# Patient Record
Sex: MALE | Race: WHITE | NOT HISPANIC OR LATINO | Employment: FULL TIME | URBAN - METROPOLITAN AREA
[De-identification: names, ages, dates, MRNs, and addresses within clinical notes are randomized per-mention and may not be internally consistent; named-entity substitution may affect disease eponyms.]

---

## 2017-10-12 ENCOUNTER — OFFICE VISIT - RIVER FALLS (OUTPATIENT)
Dept: FAMILY MEDICINE | Facility: CLINIC | Age: 57
End: 2017-10-12

## 2017-10-26 ENCOUNTER — OFFICE VISIT - RIVER FALLS (OUTPATIENT)
Dept: FAMILY MEDICINE | Facility: CLINIC | Age: 57
End: 2017-10-26

## 2017-10-26 ASSESSMENT — MIFFLIN-ST. JEOR: SCORE: 2780.53

## 2017-10-28 LAB — CREAT SERPL-MCNC: 0.86 MG/DL (ref 0.7–1.33)

## 2017-12-07 ENCOUNTER — OFFICE VISIT - RIVER FALLS (OUTPATIENT)
Dept: FAMILY MEDICINE | Facility: CLINIC | Age: 57
End: 2017-12-07

## 2017-12-07 ASSESSMENT — MIFFLIN-ST. JEOR: SCORE: 2821.35

## 2018-11-10 ENCOUNTER — OFFICE VISIT - RIVER FALLS (OUTPATIENT)
Dept: FAMILY MEDICINE | Facility: CLINIC | Age: 58
End: 2018-11-10

## 2018-11-11 LAB
CHOLEST SERPL-MCNC: 207 MG/DL
CHOLEST/HDLC SERPL: 3.9 {RATIO}
CREAT SERPL-MCNC: 0.91 MG/DL (ref 0.7–1.33)
GLUCOSE BLD-MCNC: 104 MG/DL (ref 65–99)
HDLC SERPL-MCNC: 53 MG/DL
LDLC SERPL CALC-MCNC: 137 MG/DL
NONHDLC SERPL-MCNC: 154 MG/DL
TRIGL SERPL-MCNC: 80 MG/DL

## 2019-08-22 ENCOUNTER — OFFICE VISIT - RIVER FALLS (OUTPATIENT)
Dept: FAMILY MEDICINE | Facility: CLINIC | Age: 59
End: 2019-08-22

## 2019-08-22 ASSESSMENT — MIFFLIN-ST. JEOR: SCORE: 2733.35

## 2020-03-02 ENCOUNTER — OFFICE VISIT - RIVER FALLS (OUTPATIENT)
Dept: FAMILY MEDICINE | Facility: CLINIC | Age: 60
End: 2020-03-02

## 2020-03-02 ASSESSMENT — MIFFLIN-ST. JEOR: SCORE: 2753.31

## 2021-11-29 ENCOUNTER — AMBULATORY - RIVER FALLS (OUTPATIENT)
Dept: FAMILY MEDICINE | Facility: CLINIC | Age: 61
End: 2021-11-29

## 2022-02-12 VITALS
TEMPERATURE: 98.6 F | DIASTOLIC BLOOD PRESSURE: 84 MMHG | HEART RATE: 76 BPM | BODY MASS INDEX: 40.43 KG/M2 | WEIGHT: 315 LBS | SYSTOLIC BLOOD PRESSURE: 142 MMHG | HEIGHT: 74 IN

## 2022-02-12 VITALS
TEMPERATURE: 98.4 F | HEART RATE: 68 BPM | DIASTOLIC BLOOD PRESSURE: 92 MMHG | OXYGEN SATURATION: 94 % | BODY MASS INDEX: 40.43 KG/M2 | SYSTOLIC BLOOD PRESSURE: 162 MMHG | HEART RATE: 74 BPM | SYSTOLIC BLOOD PRESSURE: 148 MMHG | TEMPERATURE: 97.9 F | HEIGHT: 74 IN | WEIGHT: 315 LBS | DIASTOLIC BLOOD PRESSURE: 82 MMHG | WEIGHT: 315 LBS

## 2022-02-12 VITALS
WEIGHT: 315 LBS | BODY MASS INDEX: 40.43 KG/M2 | DIASTOLIC BLOOD PRESSURE: 82 MMHG | HEIGHT: 74 IN | HEART RATE: 88 BPM | SYSTOLIC BLOOD PRESSURE: 140 MMHG | TEMPERATURE: 98.6 F

## 2022-02-12 VITALS
HEART RATE: 80 BPM | DIASTOLIC BLOOD PRESSURE: 84 MMHG | WEIGHT: 315 LBS | SYSTOLIC BLOOD PRESSURE: 148 MMHG | HEIGHT: 74 IN | BODY MASS INDEX: 40.43 KG/M2 | TEMPERATURE: 98.1 F

## 2022-02-15 NOTE — PROGRESS NOTES
Patient:   MARTINEZ BLUE            MRN: [id number]            FIN: [id number]               Age:   57 years     Sex:  Male     :  1960   Associated Diagnoses:   Well adult; Hypertension; Obesity; ELZA (Obstructive Sleep Apnea); Personal history of colonic polyps   Author:   Sammy Montanez MD      Visit Information      Date of Service: 2017 04:37 pm  Performing Location: [facility]  Encounter#: [id number]      Primary Care Provider (PCP):  Sammy Montanez MD    NPI# [id number]   Visit type:  Annual exam.    Accompanied by:  No one.    Source of history:  Self, Medical record.    History limitation:  None.       Chief Complaint   2017 4:43 PM CST    annual px, recently seen for HTN f/u in October        Well Adult History   Well Adult History             The patient presents for well adult exam.  The patient's general health status is described as fair.  The patient's diet is described as balanced.  Exercise: none.  Associated symptoms consist of none.  Additional pertinent history: occasional caffeine use, tobacco use none, daily alcohol use and alcohol use: 3-4 per day.       colonoscopy:  due  lipid and diabetes screening:  up to date   prostate cancer screening:  up to date   heart disease risk:  moderate  immunizations:  up to date   other:  Hypertension improving         Review of Systems   Constitutional:  No fever, No chills, No sweats, No weakness, No fatigue.    Eye:  No recent visual problem.    Ear/Nose/Mouth/Throat:  No decreased hearing, No nasal congestion, No sore throat.    Respiratory:  No shortness of breath, No cough.    Cardiovascular:  Negative, No chest pain, No palpitations, No peripheral edema.    Gastrointestinal:  No nausea, No vomiting, No diarrhea, No constipation, No heartburn.    Genitourinary:  No dysuria, No change in urine stream.    Hematology/Lymphatics:  No bruising tendency, No bleeding tendency.    Endocrine:  No cold intolerance, No  heat intolerance.    Immunologic:  Negative.    Musculoskeletal:  No back pain, No neck pain, No joint pain, No muscle pain.    Integumentary:  No rash, No dryness, No skin lesion.    Neurologic:  Alert and oriented X4, No headache.    Psychiatric:  No anxiety, No depression.      PHQ9 and CAGE reviewed and discussed with patient.       Health Status   Allergies:    Allergic Reactions (Selected)  No Known Medication Allergies   Medications:  (Selected)   Prescriptions  Prescribed  Replacement CPAP +11cm H2o: Replacement CPAP +11cm H2o, See Instructions, Instructions: Heated humidifier, heated tubing, filters, nasal or full face mask of choice with headgear.  Replacement cushions and supplies as needed.  Months = 99 (Lifetime), Supply, # 1 EA, 0 Refill(s),...  lisinopril 20 mg oral tablet: 1 tab(s) ( 20 mg ), PO, Daily, # 30 tab(s), 5 Refill(s), Type: Maintenance, Pharmacy: [facility], 1 tab(s) po daily  omeprazole 20 mg oral delayed release capsule: 1 cap(s) ( 20 mg ), PO, Daily, # 90 cap(s), 0 Refill(s), Type: Maintenance, Pharmacy: [facility] [place], 1 cap(s) po daily  Documented Medications  Documented  Aspir 81: ( 81 mg ), po, daily, tab(s), 0 Refill(s), Type: Maintenance   Problem list:    All Problems  Alcohol use / SNOMED CT 4290229 / Confirmed  Cancer of colon / SNOMED CT 256356300 / Confirmed  Hypertension / SNOMED CT 7723500095 / Confirmed  Obesity / ICD-9-.00 / Probable  ELZA (Obstructive Sleep Apnea) / ICD-9-.23 / Confirmed  Personal history of colonic polyps / SNOMED CT 0508297171 / Confirmed  Resolved: Rectal cancer / ICD-9-.1      Histories   Past Medical History:    Active  ELZA (Obstructive Sleep Apnea) (327.23): Onset in 2010 at 49 years.  Personal history of colonic polyps (2841001801): Onset on 11/9/2009 at 49 years.  Cancer of colon (223532949): Onset on 7/27/2009 at 49 years.  Obesity (278.00)  Resolved  Rectal cancer (154.1):   Resolved.   Family History:    Polyp of colon  Brother  Diabetes mellitus type II  Father  CA - Cancer of colon  Mother (unknown): onset at 72 .     Procedure history:    Colonoscopy (SNOMED CT 800674293) on 5/5/2014 at 54 Years.  Comments:  5/12/2014 5:02 PM - Lucina Davis RN  Sedation: MAC  Indication: personal history of colon cancer  Tubular adenoma x2  Repeat in 3 years  Port-A-Cath removal on 6/25/2012 at 52 Years.  Removal of two skin tags from the periocular area - Left on 6/25/2012 at 52 Years.  Colonoscopy (SNOMED CT 610352789) on 12/19/2011 at 51 Years.  Comments:  1/19/2012 2:52 PM - Quintin Stevens MA  Repeat in 2 years due to history of rectal cancer with MAC sedation  Colonoscopy (SNOMED CT 432731945) on 11/18/2010 at 50 Years.  Drainage of rectal abscess on 7/16/2010 at 50 Years.  Polysomnogram (SNOMED CT 679210229) on 5/23/2010 at 50 Years.  Comments:  11/17/2015 3:27 PM - Violet Goodman CMA  AHI 72.5/hr.  CPAP titrated to +11cm H2o which reduced AHI to 4/hr  Rectal carcinoma (SNOMED CT 865098594) on 11/9/2009 at 49 Years.  Comments:  7/12/2010 6:00 PM - Leandro Britton MD  abd perineal  resection  Colonoscopy (SNOMED CT 061087580) on 11/2/2009 at 49 Years.  Colostomy (SNOMED CT 4366596050) in the month of 11/2009 at 49 Years.  Colonoscopy (SNOMED CT 617477345) on 7/27/2009 at 49 Years.  Rotator cuff repair (SNOMED CT 870988694).  Comments:  1/21/2015 11:21 AM - Ninoska Mejia  left  Extraction of wisdom tooth (SNOMED CT 294027576).   Social History:             No active social history items have been recorded.      Physical Examination   Vital Signs   12/7/2017 4:43 PM CST Temperature Tympanic 98.1 DegF    Peripheral Pulse Rate 80 bpm    Pulse Site Radial artery    HR Method Manual    Systolic Blood Pressure 148 mmHg  HI    Diastolic Blood Pressure 84 mmHg    Mean Arterial Pressure 105 mmHg    BP Site Right arm    BP Method Manual      Measurements from flowsheet : Measurements   12/7/2017 4:43  PM CST Height Measured - Standard 73.5 in    Weight Measured - Standard 432 lb    BSA 3.18 m2    Body Mass Index 56.22 kg/m2      General:  Alert and oriented, No acute distress.    Eye:  Pupils are equal, round and reactive to light, Extraocular movements are intact, Normal conjunctiva.    HENT:  Normocephalic, Tympanic membranes are clear, Oral mucosa is moist, No pharyngeal erythema, No sinus tenderness.    Neck:  Supple, Non-tender, No carotid bruit, No lymphadenopathy, No thyromegaly.    Respiratory:  Lungs are clear to auscultation, Respirations are non-labored, Breath sounds are equal, No chest wall tenderness.    Cardiovascular:  Normal rate, Regular rhythm, No murmur, No gallop, Good pulses equal in all extremities, Normal peripheral perfusion, No edema.    Gastrointestinal:  Soft, Non-tender, Non-distended, Normal bowel sounds, No organomegaly.         Abdomen: Obese, Stoma ( Colostomy ).    Genitourinary:  No costovertebral angle tenderness.    Lymphatics:  WNL.    Musculoskeletal:  Normal range of motion, Normal strength, No tenderness, No swelling, No deformity.    Integumentary:  Warm, Dry, No rash.    Neurologic:  Alert, Oriented, Normal sensory, Normal motor function, No focal deficits.    Psychiatric:  Cooperative, Appropriate mood & affect.       Review / Management   Results review:  Lab results   10/26/2017 7:08 PM CDT Potassium Level 4.5 mmol/L    Creatinine 0.86 mg/dL    eGFR 96 mL/min/1.73m2    eGFR African American 112 mL/min/1.73m2   10/12/2017 7:15 PM CDT Sodium Level 142 mmol/L    Potassium Level 4.2 mmol/L    Chloride Level 103 mmol/L    CO2 Level 26 mmol/L    AGAP 13    Glucose Level 101 mg/dL    BUN 10 mg/dL    Creatinine 0.80 mg/dL    BUN/Creat Ratio 13    eGFR  >60    eGFR Non-African American >60    Calcium Level 9.6 mg/dL    Troponin I 0.010 ng/mL    WBC 6.7    RBC 5.09    Hgb 14.9 g/dL    Hct 44.4 %    MCV 87 fL    MCH 29.3 pg    MCHC 33.6 g/dL    RDW 14.1 %     Platelet 218    MPV 9.9 fL    Lymphs 21.5 %    Abs Lymphs 1.4    Neutrophils 67.8 %    Abs Neutrophils 4.6    Monocytes 8.3 %    Abs Monocytes 0.6    Eosinophils 2.1 %    Abs Eosinophils 0.1    Basophils 0.3 %    Abs Basophils 0.0    D-Dimer 0.31   .       Impression and Plan   Diagnosis     Well adult (NVG27-LR Z00.00).     Hypertension (AML63-BW I10).     Obesity (YWU85-NS E66.9).     ELZA (Obstructive Sleep Apnea) (PJX04-FD G47.33).     Personal history of colonic polyps (JEH70-XL Z86.010).     Course:  Progressing as expected.    Plan:  Discussed health maintenance, diet, exercise, BMI discussed, increase lisinopril to 40 mg a day, set up for colonoscopy.

## 2022-02-15 NOTE — TELEPHONE ENCOUNTER
Order, notes and insurance card are sent to [facility] and they will contact patient.Manisha Nicholson called stating she tired to schedule for patient but [facility] did not have an order.  I checked with Central Scheduling and order was there but not yet scanned into chart.  [facility] will call for scheduling today.  Manisha notified to expect call.

## 2022-02-15 NOTE — CARE COORDINATION
Pt appears on [facility] chronic disease panel as out of parameters for HTN.  Pt is in recall pool for appt, pt had labs done and provider stated in letter to make appt to come in for visit.  Will wait on pt response.

## 2022-02-15 NOTE — PROGRESS NOTES
Patient:   MARTINEZ BLUE            MRN: [id number]            FIN: [id number]               Age:   57 years     Sex:  Male     :  1960   Associated Diagnoses:   Chest tightness; Shortness of breath; Hypertension   Author:   Kay Tao      Visit Information      Date of Service: 10/12/2017 06:06 pm  Performing Location: [facility]  Encounter#: [id number]      Primary Care Provider (PCP):  Sammy Montanez MD    NPI# [id number]      Referring Provider:  Kay Tao    NPI# [id number]      Chief Complaint   10/12/2017 6:21 PM CDT   Pt c/o shortness of breath and chest tightness x2 days        History of Present Illness   reviewed presenting problem as above with patient  here with his wife  States he has had episodes of the above in the past but yesterday rates CP 8/10. Duration of an hour or so, worse with climbing stairs  Denies cough or congestion  Denies heart or lung problems in the past.  Has had HTN but has chosen not to treat it  Non smoker  No strong FH heart dz in family  Does drink 4-6 beers per day  Cholesterol has been normal about 2 years ago  Did not take any OTC meds  Has not struggled with stomach acid in past      Review of Systems   Constitutional:  No fever, No chills.    Ear/Nose/Mouth/Throat:  No ear pain, No nasal congestion, No sore throat.    Respiratory:  Shortness of breath, No cough, No wheezing.    Gastrointestinal:  No nausea, No vomiting, No diarrhea.              Health Status   Allergies:    Allergic Reactions (Selected)  No known allergies   Medications:  (Selected)   Prescriptions  Prescribed  Replacement CPAP +11cm H2o: Replacement CPAP +11cm H2o, See Instructions, Instructions: Heated humidifier, heated tubing, filters, nasal or full face mask of choice with headgear.  Replacement cushions and supplies as needed.  Months = 99 (Lifetime), Supply, # 1 EA, 0 Refill(s),...  lisinopril 10 mg oral tablet: 1 tab(s) ( 10 mg ), PO, Daily, # 30  tab(s), 0 Refill(s), Type: Maintenance, Pharmacy: [facility], 1 tab(s) po daily  omeprazole 20 mg oral delayed release capsule: 1 cap(s) ( 20 mg ), PO, Daily, # 90 cap(s), 0 Refill(s), Type: Maintenance, Pharmacy: [facility], 1 cap(s) po daily  Documented Medications  Documented  Aspir 81: ( 81 mg ), po, daily, tab(s), 0 Refill(s), Type: Maintenance   Problem list:    All Problems  Hypertension / SNOMED CT 0138140566 / Confirmed  Alcohol use / SNOMED CT 0911714 / Confirmed  Obesity / ICD-9-.00 / Probable  ELZA (Obstructive Sleep Apnea) / ICD-9-.23 / Confirmed  Cancer of colon / SNOMED CT 172005666 / Confirmed  Resolved: Rectal cancer / ICD-9-.1      Histories   Past Medical History:    Active  ELZA (Obstructive Sleep Apnea) (327.23): Onset in 2010 at 49 years.  Obesity (278.00)  Resolved  Rectal cancer (154.1):  Resolved.   Family History:    Polyp of colon  Brother  Diabetes mellitus type II  Father  CA - Cancer of colon  Mother (unknown): onset at 72 .     Procedure history:    Colonoscopy (SNOMED CT 175088446) performed by Tristan Lester MD on 5/5/2014 at 54 Years.  Comments:  5/12/2014 5:02 PM - Lucina Davis RN  Sedation: MAC  Indication: personal history of colon cancer  Tubular adenoma x2  Repeat in 3 years  Port-A-Cath removal performed by Tristan Lester MD on 6/25/2012 at 52 Years.  Removal of two skin tags from the periocular area - Left performed by Tristan Lester MD on 6/25/2012 at 52 Years.  Colonoscopy (SNOMED CT 929510523) on 12/19/2011 at 51 Years.  Comments:  1/19/2012 2:52 PM - Quintin Stevens MA  Repeat in 2 years due to history of rectal cancer with MAC sedation  Colonoscopy (SNOMED CT 522680774) performed by Tristan Lester MD on 11/18/2010 at 50 Years.  Drainage of rectal abscess performed by Tristan Lester MD on 7/16/2010 at 50 Years.  Polysomnogram (SNOMED CT 932813890) on 5/23/2010 at 50 Years.  Comments:  11/17/2015  3:27 PM - Violet Goodman CMA  AHI 72.5/hr.  CPAP titrated to +11cm H2o which reduced AHI to 4/hr  Rectal carcinoma (SNOMED CT 644832333) on 11/9/2009 at 49 Years.  Comments:  7/12/2010 6:00 PM - Leandro Britton MD  abd perineal  resection  Colonoscopy (SNOMED CT 673943824) on 11/2/2009 at 49 Years.  Colostomy (SNOMED CT 5552001150) in the month of 11/2009 at 49 Years.  Colonoscopy (SNOMED CT 108289903) on 7/27/2009 at 49 Years.  Rotator cuff repair (SNOMED CT 899585698).  Comments:  1/21/2015 11:21 AM - Ninoska Mejia  left  Extraction of wisdom tooth (SNOMED CT 482621140).   Social History:             No active social history items have been recorded.      Physical Examination   Vital Signs   10/12/2017 6:21 PM CDT Temperature Tympanic 98.4 DegF    Peripheral Pulse Rate 74 bpm    Pulse Site Brachial artery    HR Method Electronic    Systolic Blood Pressure 162 mmHg  HI    Diastolic Blood Pressure 92 mmHg  HI    Mean Arterial Pressure 115 mmHg    BP Site Right arm    BP Method Electronic    Oxygen Saturation 94 %      Measurements from flowsheet : Measurements   10/12/2017 6:21 PM CDT   Weight Measured - Standard                428.4 lb     General:  Alert and oriented, No acute distress.    Eye:  Normal conjunctiva.    HENT:  Tympanic membranes are clear, Normal hearing, Oral mucosa is moist, No pharyngeal erythema, No sinus tenderness.    Neck:  Supple, Non-tender, No lymphadenopathy.    Respiratory:  Lungs are clear to auscultation, Respirations are non-labored, Breath sounds are equal, Symmetrical chest wall expansion.    Cardiovascular:  Normal rate, Regular rhythm, No murmur.    Gastrointestinal:  Soft, Non-tender.    Musculoskeletal:  Normal range of motion, Normal gait.    Integumentary:  Warm, Dry, Pink, No rash.    Neurologic:  Alert, Oriented.    Psychiatric:  Cooperative.       Review / Management   Results review:  Lab results   10/12/2017 7:15 PM CDT Sodium Level 142 mmol/L    Potassium Level 4.2  mmol/L    Chloride Level 103 mmol/L    CO2 Level 26 mmol/L    AGAP 13    Glucose Level 101 mg/dL    BUN 10 mg/dL    Creatinine Level 0.80 mg/dL    BUN/Creat Ratio 13    eGFR  >60    eGFR Non-African American >60    Calcium Level 9.6 mg/dL    Troponin I 0.010 ng/mL    WBC 6.7    RBC 5.09    Hgb 14.9 g/dL    Hct 44.4 %    MCV 87 fL    MCH 29.3 pg    MCHC 33.6 g/dL    RDW 14.1 %    Platelet 218    MPV 9.9 fL    Lymphocytes 21.5 %    Abs Lymphocytes 1.4    Neutrophils 67.8 %    Abs Neutrophils 4.6    Monocytes 8.3 %    Abs Monocytes 0.6    Eosinophils 2.1 %    Abs Eosinophils 0.1    Basophils 0.3 %    Abs Basophils 0.0    D-Dimer 0.31   .         Interpretation: Labs unremarkable.    Radiology results   X-ray, X-ray reviewed with patient, no abnormality noted.  Will await radiology over-read and if differs such that treatment would be altered,  will notify patient.    ECG interpretation:  Time  10/12/2017 8:27:00 PM, read per Dr MARY Sousa.       Impression and Plan   Diagnosis     Chest tightness (PWH16-GT R07.89).     Shortness of breath (GXB30-CC R06.02).     Hypertension (TVU12-KX I10).     Course:  Improving, was given GI cocktail, pain from 4 to 3 .    Patient Instructions:       Counseled: Patient, Regarding diagnosis, Regarding treatment, Regarding medications, Verbalized understanding, I asked him to see his primary early next week for further eval  Will start lisinopril and regular ASA use and PPI use today  Reduce ETOH  If any recurrence of similar pain seek ER Eval.    Orders     Orders (Selected)   Outpatient Orders  Order  Return to Clinic (Request): RFV: within 2 weeks for recheck with primary of chest pain and HTN, Return in 2 weeks  Prescriptions  Prescribed  lisinopril 10 mg oral tablet: 1 tab(s) ( 10 mg ), PO, Daily, # 30 tab(s), 0 Refill(s), Type: Maintenance, Pharmacy: [facility], 1 tab(s) po daily  omeprazole 20 mg oral delayed release capsule: 1 cap(s) ( 20  mg ), PO, Daily, # 90 cap(s), 0 Refill(s), Type: Maintenance, Pharmacy: [facility], 1 cap(s) po daily.

## 2022-02-15 NOTE — RESULTS
Patient:   MARTINEZ BLUE            MRN: [id number]            FIN: [id number]               Age:   59 years     Sex:  Male     :  1960   Associated Diagnoses:   None   Author:   Lisseth GARCIA, Sammy      Procedure   EKG procedure   Date:  2019.     Indication: pre-operative exam.     EKG findings   No change from prior EKG: since  10/12/2017.     Rhythm: heart rate  83  beats/min, sinus normal, heart block RBBB.     Axis: indeterminate.     Intervals: normal.     P waves: normal.     QRS complex: normal.     ST-T-U complex: normal.     Interpretation: borderline.

## 2022-02-15 NOTE — CARE COORDINATION
Patient:   LES BLUE            MRN: [id number]            FIN: [id number]               Age:   60 years     Sex:  Male     :  1960   Associated Diagnoses:   None   Author:   Loida Acosta CMA      Care Coordination Hospital Discharge Note    Sources of Information:  [   ] Patient, family member, or caregiver (Please list): Wife stated Les is not home from work yet, but she will schedule an appointment now for him.  Asked that she have him call if he has any questions or concerns regarding his discharge instructions, f/u appointments and medications.  She stated she would and asked to be transferred to appointments.  [ X  ] Hospital discharge summary  [   ] Hospital fax  [   ] List of recent hospitalizations or ED visits  [   ] Other:   Last Attending:  Jair Rm MD    Discharged From: [facility]   Admission Date: 2020  Discharge Date: 2020  Discharge Plan: [facility] to home  Diagnosis/Problem: Small Bowel obstruction    Medication Changes: [   ] Yes [ X  ] No   Medication List Updated: [   ] Yes [ X  ] No:  Hospital Med List at Discharge Reconciled with Current Med List   Medications          *denotes recorded medication          Replacement CPAP +11cm H2o: See Instructions, Heated humidifier, heated tubing, filters, nasal or full face mask of choice with headgear.  Replacement cushions and supplies as needed.  Months = 99 (Lifetime), 1 EA, 0 Refill(s).          *Aspir 81: 81 mg, po, daily, tab(s).          lisinopril 40 mg oral tablet: 40 mg, 1 tab(s), po, daily, 30 tab(s), 5 Refill(s).          omeprazole 20 mg oral delayed release capsule: See Instructions, TAKE ONE CAPSULE BY MOUTH EVERY DAY, 90 unknown unit.    Needs Referral or Lab: [   ] Yes [ X  ] No  Outpatient Provider Recommendations:  Consider stool softener addition to home regimen.  Monitor blood pressure outpatient with persistent borderline measurements during hospitalization  Needs Follow-up Appointment:  [ X  ] Within  7 days of discharge (highly complex visit)  [   ] Within 14 days of discharge (moderately complex visit)    Appointment Made With: 03/02/2020 [facility]  Date:    Additional Information Needed and Requested:  [   ] Yes:  [  X ] No

## 2022-02-15 NOTE — PROGRESS NOTES
Chief Complaint    HTN f/u, review labs from 10/12/17, refill meds.  History of Present Illness      Patient is here for follow-up on his visit a few weeks ago.  He was seen with chest discomfort and abdominal distention.  The chest discomfort has resolved.  He felt he may have had a partial obstruction of his bowel at the time.  He reports feeling up to 10 colostomy bags the following day and feeling much better.  This has happened occasionally in the past.      He has not been checking his blood pressure.  He was started on lisinopril 10 mg daily.  His laboratory evaluation at the previous visit was unremarkable.  Chest x-ray was normal as well as ECG.  There are no side effects from the lisinopril.  Review of Systems      See HPI.  All other review of systems negative.  Physical Exam   Vitals & Measurements    T: 97.9(Tympanic)  HR: 68(Peripheral)  BP: 148/82     HT: 73.5 in  WT: 423 lb  BMI: 55.05       Alert, oriented, no acute distress      Lungs are clear       Heart has a regular rate and rhythm       Abdomen's obese soft nontender, colostomy bag present  Assessment/Plan       Hypertension         This is improving.  It is certainly not at goal.  I have increased lisinopril to 20 mg daily.  He will recheck in about a month.  Creatinine and potassium done today         Ordered:          Creatinine* ([facility]), Specimen Type: Serum, Collection Date: 10/26/17 18:58:00 CDT          Potassium* ([facility]), Specimen Type: Serum, Collection Date: 10/26/17 18:58:00 CDT                Mechanical ileus         This has resolved.  We will continue to monitor his gut function.  Colostomy seems to be working well                Orders:         lisinopril, 1 tab(s) ( 10 mg ), PO, Daily, # 30 tab(s), 0 Refill(s), Type: Hard Stop, Pharmacy: [facility]         lisinopril, 1 tab(s) ( 20 mg ), PO, Daily, # 30 tab(s), 5 Refill(s), Type: Maintenance, Pharmacy: [facility], 1 tab(s) po  daily  Patient Information     Name:MARTINEZ BLUE      Address:      [address] [place] [id number]     Sex:Male     YOB: 1960     [phone]     Emergency Contact:AURELIA BLUE     MRN:[id number]     FIN:[id number]     Location:[facility]     Date of Service:10/26/2017      Primary Care Physician:       Sammy Montanez MD, [phone]  Problem List/Past Medical History    Ongoing     Alcohol use     Cancer of colon     Hypertension     Obesity     ELZA (Obstructive Sleep Apnea)     Personal history of colonic polyps    Historical     Rectal cancer  Procedure/Surgical History     Colonoscopy (05/05/2014)     Port-A-Cath removal (06/25/2012)     Removal of two skin tags from the periocular area - Left (06/25/2012)     Colonoscopy (12/19/2011)     Colonoscopy (11/18/2010)     Drainage of rectal abscess (07/16/2010)     Polysomnogram (05/23/2010)     Rectal carcinoma (11/09/2009)     Colonoscopy (11/02/2009)     Colostomy (11/2009)     Colonoscopy (07/27/2009)     Extraction of wisdom tooth     Rotator cuff repair  Medications    Aspir 81, 81 mg, po, daily    lisinopril 20 mg oral tablet, 20 mg= 1 tab(s), po, daily, 5 refills    omeprazole 20 mg oral delayed release capsule, 20 mg= 1 cap(s), po, daily    Replacement CPAP +11cm H2o, See Instructions  Allergies    No known allergies  Social History    Smoking Status - 10/26/2017     Never smoker  Family History    CA - Cancer of colon: Mother (Dx at 72).    Diabetes mellitus type II: Father.    Polyp of colon: Brother.  Immunizations      Vaccine Date Status      tetanus/diphth/pertuss (Tdap) adult/adol 04/23/2008 Recorded      tetanus/diphth/pertuss (Tdap) adult/adol 04/23/2008 Recorded  Lab Results      Results (Last 90 days)                Laboratory                     Chemistry                          Cardiac Chemistries                               Troponin I                                      0.010 ng/mL  (10/12/17 07:15 PM CDT)                                                                                                                                                   (10/12/17 07:15 PM CDT)                                                                                                                                     General Chemistry                               AGAP:      13   (10/12/17 07:15 PM CDT)                                                                                                                                          BUN:      10 mg/dL  (10/12/17 07:15 PM CDT)                                                                                                                                          BUN/Creat Ratio:      13   (10/12/17 07:15 PM CDT)                                                                                                                                          CO2 Level:      26 mmol/L  (10/12/17 07:15 PM CDT)                                                                                                                                          Calcium Level:      9.6 mg/dL  (10/12/17 07:15 PM CDT)                                                                                                                                          Chloride Level:      103 mmol/L  (10/12/17 07:15 PM CDT)                                                                                                                                          Creatinine Level:      0.80 mg/dL  (10/12/17 07:15 PM CDT)                                                                                                                                          Glucose Level:      H 101 mg/dL (Range 65 - 100)  (10/12/17 07:15 PM CDT)                                                                                                                                          Potassium Level:      4.2 mmol/L   (10/12/17 07:15 PM CDT)                                                                                                                                          Sodium Level:      142 mmol/L  (10/12/17 07:15 PM CDT)                                                                                                                                          eGFR :      >60   (10/12/17 07:15 PM CDT)                                                                                                                                          eGFR Non-:      >60   (10/12/17 07:15 PM CDT)                                                                                                                                Coagulation                          D-Dimer                               D-Dimer                                        (10/12/17 07:15 PM CDT)                                                                                                                                                0.31   (10/12/17 07:15 PM CDT)                                                                                                                                Hematology                          CBC                               Hct:      44.4 %  (10/12/17 07:15 PM CDT)                                                                                                                                          Hgb:      14.9 g/dL  (10/12/17 07:15 PM CDT)                                                                                                                                          MCH:      29.3 pg  (10/12/17 07:15 PM CDT)                                                                                                                                          MCHC:      33.6 g/dL  (10/12/17 07:15 PM CDT)                                                                                                                                           MCV:      87 fL  (10/12/17 07:15 PM CDT)                                                                                                                                          MPV:      9.9 fL  (10/12/17 07:15 PM CDT)                                                                                                                                          Platelet:      218   (10/12/17 07:15 PM CDT)                                                                                                                                          RBC:      5.09   (10/12/17 07:15 PM CDT)                                                                                                                                          RDW:      14.1 %  (10/12/17 07:15 PM CDT)                                                                                                                                          WBC:      6.7   (10/12/17 07:15 PM CDT)                                                                                                                                     Differential                               Abs Basophils:      0.0   (10/12/17 07:15 PM CDT)                                                                                                                                          Abs Eosinophils:      0.1   (10/12/17 07:15 PM CDT)                                                                                                                                          Abs Lymphocytes:      1.4   (10/12/17 07:15 PM CDT)                                                                                                                                          Abs Monocytes:      0.6   (10/12/17 07:15 PM CDT)                                                                                                                                          Abs Neutrophils:      4.6   (10/12/17 07:15 PM CDT)                                                                                                                                           Basophils:      0.3 %  (10/12/17 07:15 PM CDT)                                                                                                                                          Eosinophils:      2.1 %  (10/12/17 07:15 PM CDT)                                                                                                                                          Lymphocytes:      21.5 %  (10/12/17 07:15 PM CDT)                                                                                                                                          Monocytes:      8.3 %  (10/12/17 07:15 PM CDT)                                                                                                                                          Neutrophils:      67.8 %  (10/12/17 07:15 PM CDT)

## 2022-02-15 NOTE — PROGRESS NOTES
Patient:   MARTINEZ BLUE            MRN: [id number]            FIN: [id number]               Age:   57 years     Sex:  Male     :  1960   Associated Diagnoses:   None   Author:   Lars GARCIA, Vito      Procedure   EKG procedure   Indication: chest pain.     Position: supine.     EKG findings   Interpretation: by primary care provider.     Rhythm: heart rate  71  beats/min, sinus normal.     Axis: normal axis, normal configuration.     Within normal limits.     Intervals: AL normal, QRS normal, QT normal.     Normal EKG.     P waves: normal.     QRS complex: normal, no Q waves present.     ST-T-U complex: normal.     Interpretation: no ST-T wave abnormalities.     Discussed: with primary care provider.        Impression and Plan   Diagnosis     Normal EKG.     Orders

## 2022-02-15 NOTE — PROGRESS NOTES
Chief Complaint    here for colonoscopy order.  History of Present Illness      Patient is here for exam prior to colonoscopy.  He has history of rectal cancer and has a colostomy.  Seems to be performing well.  He has no other concerns.  There are no complications with previous colonoscopy.  He is at high risk due to his weight.  BMI is over 45  Review of Systems      See HPI.  All other review of systems negative.  Physical Exam   Vitals & Measurements    T: 98.6   F (Tympanic)  HR: 88(Peripheral)  BP: 140/82     HT: 73.5 in  WT: 412.6 lb  BMI: 53.69       Alert, oriented, no acute distress       Nonlabored breathing, lungs clear       Heart is regular rate and rhythm, no murmurs, no gallops, no rubs       Abdomen is obese, soft, nontender, herniation present around the ostomy.       ECG is unchanged from previous tracings.  No acute findings.  Assessment/Plan       1. Cancer of colon (C18.9)         Will be set up for colonoscopy.  Request sent to referrals.       2. Personal history of colonic polyps (Z86.010)  Patient Information     Name:MARTINEZ BLUE      Address:      [address] [place] [id number]     Sex:Male     YOB: 1960     [phone]     Emergency Contact:AURELIA BLUE     MRN:[id number]     FIN:[id number]     Location[facility]     Date of Service:08/22/2019      Primary Care Physician:       Sammy Montanez MD, [phone]      Attending Physician:       Sammy Montanez MD, [phone]  Problem List/Past Medical History    Ongoing     Alcohol use     Cancer of colon     Hypertension     Obesity     ELZA (obstructive sleep apnea)     Personal history of colonic polyps    Historical     Rectal cancer  Procedure/Surgical History     Colonoscopy (05/05/2014)      Comments: Sedation: MAC      Indication: personal history of colon cancer      Tubular adenoma x2      Repeat in 3 years.     Port-A-Cath removal (06/25/2012)     Removal  of two skin tags from the periocular area - Left (06/25/2012)     Colonoscopy (12/19/2011)      Comments: Repeat in 2 years due to history of rectal cancer with MAC sedation.     Colonoscopy (11/18/2010)     Drainage of rectal abscess (07/16/2010)     Polysomnogram (05/23/2010)      Comments: AHI 72.5/hr.  CPAP titrated to +11cm H2o which reduced AHI to 4/hr.     Rectal carcinoma (11/09/2009)      Comments: abd perineal  resection.     Colonoscopy (11/02/2009)     Colostomy (11.2009)     Colonoscopy (07/27/2009)     Extraction of wisdom tooth     Rotator cuff repair      Comments: left.  Medications    Aspir 81, 81 mg, Oral, daily    lisinopril 40 mg oral tablet, 40 mg= 1 tab(s), Oral, daily, 5 refills    omeprazole 20 mg oral delayed release capsule, See Instructions    Replacement CPAP +11cm H2o, See Instructions  Allergies    No Known Medication Allergies  Social History    Smoking Status - 08/22/2019     Never smoker     Alcohol      Current, 3 drinks/episode average. 4 drinks/episode maximum., 12/12/2017     Employment/School      Employed, Work/School description: ., 12/12/2017     Exercise      Exercise frequency: Never., 12/12/2017     Home/Environment      Marital status: . Spouse/Partner name: Manisha. Risks in environment: Does not wear helmet, sometimes wears seat belt, owns secured gun., 12/12/2017     Nutrition/Health      Type of diet: Regular., 12/12/2017     Substance Abuse      Never, 12/12/2017     Tobacco      Never (less than 100 in lifetime), 12/12/2017  Family History    CA - Cancer of colon: Mother (Dx at 72).    Diabetes mellitus type II: Father.    Polyp of colon: Brother.  Immunizations      Vaccine Date Status      influenza virus vaccine, inactivated 09/29/2010 Recorded      influenza virus vaccine, inactivated 09/23/2009 Recorded      tetanus/diphth/pertuss (Tdap) adult/adol 04/23/2008 Recorded      tetanus/diphth/pertuss (Tdap) adult/adol 04/23/2008 Recorded

## 2022-02-15 NOTE — NURSING NOTE
Comprehensive Intake Entered On:  3/2/2020 4:47 PM CST    Performed On:  3/2/2020 4:42 PM CST by Rosy ARELLANO, Brigida               Summary   Chief Complaint :   post hospital f/u--SBO.  also check BP   Weight Measured :   417 lb(Converted to: 417 lb 0 oz, 189.15 kg)    Height Measured :   73.5 in(Converted to: 6 ft 1 in, 186.69 cm)    Body Mass Index :   54.26 kg/m2 (HI)    Body Surface Area :   3.13 m2   Systolic Blood Pressure :   142 mmHg (HI)    Diastolic Blood Pressure :   84 mmHg (HI)    Mean Arterial Pressure :   103 mmHg   Peripheral Pulse Rate :   76 bpm   BP Site :   Right arm   Pulse Site :   Radial artery   BP Method :   Manual   HR Method :   Manual   Temperature Tympanic :   98.6 DegF(Converted to: 37.0 DegC)    Languages :   English   Brigida Gallegos MA - 3/2/2020 4:42 PM CST   Health Status   Allergies Verified? :   Yes   Medication History Verified? :   Yes   Medical History Verified? :   Yes   Pre-Visit Planning Status :   Completed   Tobacco Use? :   Never smoker   Hospitalized since last visit? :   Yes   Inpatient? :   Yes   Date of Discharge :   2/22/2020 CST   Follow-up visit date :   3/2/2020 CST   Discharge/Transition Plan Provided? :   Yes   Med List Reconciled? :   Yes   Brigida Gallegos MA - 3/2/2020 4:42 PM CST   Consents   Consent for Immunization Exchange :   Consent Granted   Consent for Immunizations to Providers :   Consent Granted   Brigida Gallegos MA - 3/2/2020 4:42 PM CST   Meds / Allergies   (As Of: 3/2/2020 4:47:32 PM CST)   Allergies (Active)   No Known Medication Allergies  Estimated Onset Date:   Unspecified ; Created By:   Heidi Cross CMA; Reaction Status:   Active ; Category:   Drug ; Substance:   No Known Medication Allergies ; Type:   Allergy ; Updated By:   Heidi Cross CMA; Reviewed Date:   12/7/2017 4:48 PM CST        Medication List   (As Of: 3/2/2020 4:47:32 PM CST)   Prescription/Discharge Order    lisinopril  :   lisinopril ; Status:   Prescribed ; Ordered  As Mnemonic:   lisinopril 40 mg oral tablet ; Simple Display Line:   40 mg, 1 tab(s), po, daily, 30 tab(s), 5 Refill(s) ; Ordering Provider:   Sammy Montanez MD; Catalog Code:   lisinopril ; Order Dt/Tm:   12/7/2017 6:00:33 PM CST          Miscellaneous Rx Supply  :   Miscellaneous Rx Supply ; Status:   Prescribed ; Ordered As Mnemonic:   Replacement CPAP +11cm H2o ; Simple Display Line:   See Instructions, Heated humidifier, heated tubing, filters, nasal or full face mask of choice with headgear.  Replacement cushions and supplies as needed.  Months = 99 (Lifetime), 1 EA, 0 Refill(s) ; Ordering Provider:   Nima Banks MD; Catalog Code:   Miscellaneous Rx Supply ; Order Dt/Tm:   11/19/2015 12:57:55 PM CST          omeprazole 20 mg oral delayed release capsule  :   omeprazole 20 mg oral delayed release capsule ; Status:   Prescribed ; Ordered As Mnemonic:   omeprazole 20 mg oral delayed release capsule ; Simple Display Line:   See Instructions, TAKE ONE CAPSULE BY MOUTH EVERY DAY, 90 unknown unit ; Ordering Provider:   Sammy Montanez MD; Catalog Code:   omeprazole ; Order Dt/Tm:   4/16/2018 11:04:03 AM CDT            Home Meds    aspirin  :   aspirin ; Status:   Documented ; Ordered As Mnemonic:   Aspir 81 ; Simple Display Line:   81 mg, po, daily, tab(s) ; Catalog Code:   aspirin ; Order Dt/Tm:   12/19/2011 8:03:04 AM CST            Social History   Social History   (As Of: 3/2/2020 4:47:32 PM CST)   Alcohol:        Current, 3 drinks/episode average.  4 drinks/episode maximum.   (Last Updated: 12/12/2017 10:26:08 AM CST by Marcela Chou)          Tobacco:        Never (less than 100 in lifetime)   (Last Updated: 12/12/2017 10:26:12 AM CST by Marcela Chou)          Substance Abuse:        Never   (Last Updated: 12/12/2017 10:26:16 AM CST by Marcela Chou)          Employment/School:        Employed, Work/School description: .   (Last Updated: 12/12/2017 10:25:32 AM CST by Marcela Chou)           Home/Environment:        Marital status: .  Spouse/Partner name: Manisha.  Risks in environment: Does not wear helmet, sometimes wears seat belt, owns secured gun.   (Last Updated: 12/12/2017 10:25:57 AM CST by Marcela Chou)          Nutrition/Health:        Type of diet: Regular.   (Last Updated: 12/12/2017 10:26:20 AM CST by Marcela Chou)          Exercise:        Exercise frequency: Never.   (Last Updated: 12/12/2017 10:26:24 AM CST by Marcela Chou)

## 2022-02-15 NOTE — PROGRESS NOTES
Chief Complaint    post hospital f/u--SBO.  also check BP  History of Present Illness      Patient is here for his hospital follow up for SBO.  He had a NG tube placed and was able to have normal bowel function with medical treatment.  It was felt that he has some adhesions that may be causing his SBO.  He does have an ostomy in place from previous surgery for colon cancer.  He was doing well after discharge until yesterday, when he felt he was obstructed again.  He was able to take some MOM and symptoms have improved.  It was suggested that he start taking a stool softener to help prevent further obstruction.  He had a colonoscopy done September 2019 and was normal, no narrowing seen.      He is also here for his BP follow up.  He was prescribed lisinopril 40mg daily in December 2017 but hasn't been taking his medication for the past year.  He does use his CPAP for ELZA.  Review of Systems           See HPI.  All other review of systems negative.              Physical Exam   Vitals & Measurements    T: 98.6   F (Tympanic)  HR: 76(Peripheral)  BP: 142/84     HT: 73.5 in  WT: 417 lb  BMI: 54.26           General:  Alert and oriented, No acute distress.            Eye:  Pupils are equal, round and reactive to light, Normal conjunctiva.            HENT:  Oral mucosa is moist.            Neck:  Supple.            Respiratory:  Respirations are non-labored.            Cardiovascular:  Normal rate, Regular rhythm, No edema.            Gastrointestinal:  Non-distended.            Musculoskeletal:  Normal gait.            Integumentary:  Warm, No rash.            Psychiatric:  Cooperative, Appropriate mood & affect, Normal judgment.             Assessment/Plan       1. S/p small bowel obstruction (Z87.19)         Reviewed hospital discharge with patient.  Recommend starting Miralax for obstruction prevention.                2. Hypertension (I10)         Resume lisinopril, taking 20mg daily.  Check BP either at home or in  clinic, let us know what BP values are.                3. Encounter for immunization (Z23)         Adacel today.         Ordered:          tetanus/diphth/pertuss (Tdap) adult/adol, 0.5 mL, im, once, (Completed)          51191 imadm prq id subq/im njxs 1 vaccine (Charge), Quantity: 1, Encounter for immunization          16852 tdap vaccine 7/> yr im (Charge), Quantity: 1, Encounter for immunization                Orders:         lisinopril, = 1 tab(s) ( 20 mg ), Oral, daily, # 30 tab(s), 5 Refill(s), Type: Maintenance, Pharmacy: [facility], 1 tab(s) Oral daily, (Ordered)         Return to Clinic (Request), RFV: surveillance colonoscopy, Return in 9/1/2024      IBrigida MA, acted solely as a scribe for, and in the presence of Dr. Sammy Montanez who performed the services.             ISammy MD, personally performed the services described in this documentation.  The documentation was scribed in my presence and is both accurate and complete.                Patient Information     Name:MARTINEZ BLUE      Address:      [address] [place] [id number]     Sex:Male     YOB: 1960     [phone]     Emergency Contact:AURELIA BLUE     MRN:[id number]     FIN:[id number]     Location:[facility]     Date of Service:03/02/2020      Primary Care Physician:       Sammy Montanez MD, [phone]      Attending Physician:       Sammy Montanez MD, [phone]  Problem List/Past Medical History    Ongoing     Alcohol use     Cancer of colon     Hypertension     Obesity     ELZA (obstructive sleep apnea)     Personal history of colonic polyps    Historical     Inpatient stay       Comments: @[place], [place] - Small bowel obstruction     Rectal cancer  Procedure/Surgical History     Colonoscopy (09/13/2019)      Comments: Indication:  personal hx colon cancer      Sedation:  MAC      Findings:  sessile  polyps x2, size 6-8mm, sigmoid and ascending colon      Recommendations:  f/u 5yrs.     Colonoscopy (05/05/2014)      Comments: Sedation: MAC      Indication: personal history of colon cancer      Tubular adenoma x2      Repeat in 3 years.     Port-A-Cath removal (06/25/2012)     Removal of two skin tags from the periocular area - Left (06/25/2012)     Colonoscopy (12/19/2011)      Comments: Repeat in 2 years due to history of rectal cancer with MAC sedation.     Colonoscopy (11/18/2010)     Colonoscopy (09/13/2010)      Comments: Indication:  History of colon cancer.        Sedation:  MAC.      Impression:  Two 6 - 8 mm polyps in sigmoid colon.        Recommendation:  Repeat in 5 years..     Drainage of rectal abscess (07/16/2010)     Polysomnogram (05/23/2010)      Comments: AHI 72.5/hr.  CPAP titrated to +11cm H2o which reduced AHI to 4/hr.     Rectal carcinoma (11/09/2009)      Comments: abd perineal  resection.     Colonoscopy (11/02/2009)     Colostomy (11.2009)     Colonoscopy (07/27/2009)     Extraction of wisdom tooth     Rotator cuff repair      Comments: left.  Medications    Adacel (Tdap), 0.5 mL, IM, once    Aspir 81, 81 mg, Oral, daily,   Not taking    lisinopril 20 mg oral tablet, 20 mg= 1 tab(s), Oral, daily, 5 refills    omeprazole 20 mg oral delayed release capsule, See Instructions,   Not taking    Replacement CPAP +11cm H2o, See Instructions  Allergies    No Known Medication Allergies  Social History    Smoking Status - 03/02/2020     Never smoker     Alcohol      Current, 3 drinks/episode average. 4 drinks/episode maximum., 12/12/2017     Employment/School      Employed, Work/School description: ., 12/12/2017     Exercise      Exercise frequency: Never., 12/12/2017     Home/Environment      Marital status: . Spouse/Partner name: Manisha. Risks in environment: Does not wear helmet, sometimes wears seat belt, owns secured gun., 12/12/2017     Nutrition/Health      Type of diet:  Regular., 12/12/2017     Substance Abuse      Never, 12/12/2017     Tobacco      Never (less than 100 in lifetime), 12/12/2017  Family History    CA - Cancer of colon: Mother (Dx at 72).    Diabetes mellitus type II: Father.    Polyp of colon: Brother.  Immunizations      Vaccine Date Status          influenza virus vaccine, inactivated 09/29/2010 Recorded          influenza virus vaccine, inactivated 09/23/2009 Recorded          tetanus/diphth/pertuss (Tdap) adult/adol 04/23/2008 Recorded          tetanus/diphth/pertuss (Tdap) adult/adol 04/23/2008 Recorded

## 2022-02-15 NOTE — NURSING NOTE
Comprehensive Intake Entered On:  8/22/2019 5:02 PM CDT    Performed On:  8/22/2019 4:57 PM CDT by Rosy ARELLANO, Brigida               Summary   Chief Complaint :   here for colonoscopy order.   Weight Measured :   412.6 lb(Converted to: 412 lb 10 oz, 187.15 kg)    Height Measured :   73.5 in(Converted to: 6 ft 1 in, 186.69 cm)    Body Mass Index :   53.69 kg/m2 (HI)    Body Surface Area :   3.11 m2   Systolic Blood Pressure :   140 mmHg (HI)    Diastolic Blood Pressure :   82 mmHg (HI)    Mean Arterial Pressure :   101 mmHg   Peripheral Pulse Rate :   88 bpm   BP Site :   Right arm   Pulse Site :   Radial artery   BP Method :   Manual   HR Method :   Manual   Temperature Tympanic :   98.6 DegF(Converted to: 37.0 DegC)    Languages :   English   Briigda Gallegos MA - 8/22/2019 4:57 PM CDT   Health Status   Allergies Verified? :   Yes   Medication History Verified? :   Yes   Medical History Verified? :   Yes   Pre-Visit Planning Status :   Completed   Tobacco Use? :   Never smoker   Brigida Gallegos MA - 8/22/2019 4:57 PM CDT   Consents   Consent for Immunization Exchange :   Consent Granted   Consent for Immunizations to Providers :   Consent Granted   Brigida Gallegos MA - 8/22/2019 4:57 PM CDT   Meds / Allergies   (As Of: 8/22/2019 5:02:46 PM CDT)   Allergies (Active)   No Known Medication Allergies  Estimated Onset Date:   Unspecified ; Created By:   Heidi Cross CMA; Reaction Status:   Active ; Category:   Drug ; Substance:   No Known Medication Allergies ; Type:   Allergy ; Updated By:   Heidi Cross CMA; Reviewed Date:   12/7/2017 4:48 PM CST        Medication List   (As Of: 8/22/2019 5:02:46 PM CDT)   Prescription/Discharge Order    lisinopril  :   lisinopril ; Status:   Prescribed ; Ordered As Mnemonic:   lisinopril 40 mg oral tablet ; Simple Display Line:   40 mg, 1 tab(s), po, daily, 30 tab(s), 5 Refill(s) ; Ordering Provider:   Sammy Montanez MD; Catalog Code:   lisinopril ; Order Dt/Tm:   12/7/2017  6:00:33 PM          Miscellaneous Rx Supply  :   Miscellaneous Rx Supply ; Status:   Prescribed ; Ordered As Mnemonic:   Replacement CPAP +11cm H2o ; Simple Display Line:   See Instructions, Heated humidifier, heated tubing, filters, nasal or full face mask of choice with headgear.  Replacement cushions and supplies as needed.  Months = 99 (Lifetime), 1 EA, 0 Refill(s) ; Ordering Provider:   Nima Banks MD; Catalog Code:   Miscellaneous Rx Supply ; Order Dt/Tm:   11/19/2015 12:57:55 PM          omeprazole 20 mg oral delayed release capsule  :   omeprazole 20 mg oral delayed release capsule ; Status:   Prescribed ; Ordered As Mnemonic:   omeprazole 20 mg oral delayed release capsule ; Simple Display Line:   See Instructions, TAKE ONE CAPSULE BY MOUTH EVERY DAY, 90 unknown unit ; Ordering Provider:   Sammy Montanez MD; Catalog Code:   omeprazole ; Order Dt/Tm:   4/16/2018 11:04:03 AM            Home Meds    aspirin  :   aspirin ; Status:   Documented ; Ordered As Mnemonic:   Aspir 81 ; Simple Display Line:   81 mg, po, daily, tab(s) ; Catalog Code:   aspirin ; Order Dt/Tm:   12/19/2011 8:03:04 AM            Social History   Social History   (As Of: 8/22/2019 5:02:46 PM CDT)   Alcohol:        Current, 3 drinks/episode average.  4 drinks/episode maximum.   (Last Updated: 12/12/2017 10:26:08 AM CST by Marcela Chou)          Tobacco:        Never (less than 100 in lifetime)   (Last Updated: 12/12/2017 10:26:12 AM CST by Marcela Chou)          Substance Abuse:        Never   (Last Updated: 12/12/2017 10:26:16 AM CST by Marcela Chou)          Employment/School:        Employed, Work/School description: .   (Last Updated: 12/12/2017 10:25:32 AM CST by Marcela Chou)          Home/Environment:        Marital status: .  Spouse/Partner name: Manisha.  Risks in environment: Does not wear helmet, sometimes wears seat belt, owns secured gun.   (Last Updated: 12/12/2017 10:25:57 AM CST by Marcela Chou)           Nutrition/Health:        Type of diet: Regular.   (Last Updated: 12/12/2017 10:26:20 AM CST by Marcela Chou)          Exercise:        Exercise frequency: Never.   (Last Updated: 12/12/2017 10:26:24 AM CST by Marcela Chou)

## 2022-02-15 NOTE — LETTER
(Inserted Image. Unable to display)         February 20, 2020        MARTINEZ BLUE  [address] [place], [place] [id number]        Dear MARTINEZ,    Thank you for selecting [facility] for your healthcare needs.    Our records indicate you are due for the following services:     Clinical Support Staff (CSS)-Only Blood Pressure Check ~ Please stop in anytime to have your blood pressure rechecked. This is a free service and no appointment necessary.     So we can best determine if your medications are effective in lowering your blood pressure, please make sure your blood pressure medicine has been in your system for at least 1-2 hours prior to coming in.  We encourage you to avoid caffeine or other stimulants prior to having your blood pressure checked and come at a time when you are not feeling rushed.     If you check your blood pressure at home, please bring in your blood pressure monitor and home blood pressure readings.  We will check your machine for accuracy and also share your home readings with your Healthcare Provider.     To schedule an appointment or if you have further questions, please contact your primary clinic:   [facility]       [phone]   [facility]       [phone]              [facility]     [phone]      Powered by [employer]    Sincerely,    Sammy Montanez MD

## 2024-06-13 ENCOUNTER — TELEPHONE (OUTPATIENT)
Dept: FAMILY MEDICINE | Facility: CLINIC | Age: 64
End: 2024-06-13
Payer: COMMERCIAL

## 2024-06-13 NOTE — TELEPHONE ENCOUNTER
Reason for Call:  Appointment Request    Patient requesting this type of appt:  Hospital/ED Follow-Up     Requested provider:  Jose Angel Sousa ( Followed patient during his hopistal stay at [facility]).     Reason patient unable to be scheduled: Not within requested timeframe    When does patient want to be seen/preferred time: 3-7 days    Comments: wanting to get patient in on 6/18/24. Patient discharged 6/13/24 from [facility]. Small bowel obstruction.    *Update: Nurse called to schedule but needed the appointment to be scheduled before patient gets discharged. Call got transferred to .     Okay to leave a detailed message?: No at Other phone number:  [phone] or  [phone]      Call taken on 6/13/2024 at 10:39 AM by Ijeoma Harman

## 2024-06-18 ENCOUNTER — OFFICE VISIT (OUTPATIENT)
Dept: FAMILY MEDICINE | Facility: CLINIC | Age: 64
End: 2024-06-18
Payer: COMMERCIAL

## 2024-06-18 VITALS
SYSTOLIC BLOOD PRESSURE: 142 MMHG | BODY MASS INDEX: 40.43 KG/M2 | RESPIRATION RATE: 20 BRPM | TEMPERATURE: 97.3 F | HEIGHT: 74 IN | WEIGHT: 315 LBS | OXYGEN SATURATION: 97 % | HEART RATE: 61 BPM | DIASTOLIC BLOOD PRESSURE: 88 MMHG

## 2024-06-18 DIAGNOSIS — Z85.048 HISTORY OF COLORECTAL CANCER: ICD-10-CM

## 2024-06-18 DIAGNOSIS — I10 BENIGN ESSENTIAL HYPERTENSION: ICD-10-CM

## 2024-06-18 DIAGNOSIS — Z12.12 SCREENING FOR COLORECTAL CANCER: ICD-10-CM

## 2024-06-18 DIAGNOSIS — J45.20 MILD INTERMITTENT ASTHMA WITHOUT COMPLICATION: ICD-10-CM

## 2024-06-18 DIAGNOSIS — E66.01 MORBID OBESITY (H): ICD-10-CM

## 2024-06-18 DIAGNOSIS — G47.00 INSOMNIA, UNSPECIFIED TYPE: ICD-10-CM

## 2024-06-18 DIAGNOSIS — E78.5 HYPERLIPIDEMIA LDL GOAL <100: ICD-10-CM

## 2024-06-18 DIAGNOSIS — Z12.11 SCREENING FOR COLORECTAL CANCER: ICD-10-CM

## 2024-06-18 DIAGNOSIS — K56.609 SMALL BOWEL OBSTRUCTION (H): ICD-10-CM

## 2024-06-18 DIAGNOSIS — Z00.00 HEALTH CARE MAINTENANCE: ICD-10-CM

## 2024-06-18 DIAGNOSIS — Z93.3 STATUS POST COLOSTOMY (H): Primary | ICD-10-CM

## 2024-06-18 DIAGNOSIS — C18.9 COLON ADENOCARCINOMA (H): ICD-10-CM

## 2024-06-18 PROCEDURE — 99214 OFFICE O/P EST MOD 30 MIN: CPT | Performed by: INTERNAL MEDICINE

## 2024-06-18 RX ORDER — ALBUTEROL SULFATE 90 UG/1
2 AEROSOL, METERED RESPIRATORY (INHALATION) 4 TIMES DAILY
COMMUNITY
Start: 2024-02-03 | End: 2024-06-18

## 2024-06-18 RX ORDER — POLYETHYLENE GLYCOL 3350 17 G/17G
17 POWDER, FOR SOLUTION ORAL DAILY
COMMUNITY

## 2024-06-18 RX ORDER — ATORVASTATIN CALCIUM 40 MG/1
40 TABLET, FILM COATED ORAL DAILY
COMMUNITY
End: 2024-06-18

## 2024-06-18 RX ORDER — ATORVASTATIN CALCIUM 40 MG/1
40 TABLET, FILM COATED ORAL DAILY
Qty: 90 TABLET | Refills: 3 | Status: SHIPPED | OUTPATIENT
Start: 2024-06-18

## 2024-06-18 RX ORDER — OLMESARTAN MEDOXOMIL 40 MG/1
40 TABLET ORAL DAILY
Qty: 90 TABLET | Refills: 3 | Status: SHIPPED | OUTPATIENT
Start: 2024-06-18

## 2024-06-18 RX ORDER — ALBUTEROL SULFATE 90 UG/1
2 AEROSOL, METERED RESPIRATORY (INHALATION) 4 TIMES DAILY
Qty: 18 G | Refills: 3 | Status: SHIPPED | OUTPATIENT
Start: 2024-06-18

## 2024-06-18 RX ORDER — ASPIRIN 81 MG/1
81 TABLET ORAL DAILY
COMMUNITY

## 2024-06-18 RX ORDER — OLMESARTAN MEDOXOMIL 40 MG/1
40 TABLET ORAL DAILY
COMMUNITY
Start: 2023-02-01 | End: 2024-06-18

## 2024-06-18 RX ORDER — LORAZEPAM 0.5 MG/1
0.5 TABLET ORAL EVERY 6 HOURS PRN
Qty: 24 TABLET | Refills: 0 | Status: SHIPPED | OUTPATIENT
Start: 2024-06-18

## 2024-06-18 NOTE — ASSESSMENT & PLAN NOTE
Prior history of colorectal cancer; prior hemicolectomy  Maintaining long-term colostomy  -Due for endoscopic surveillance, colorectal cancer screening  -Referred to [facility] for endoscopy; last completed 2019

## 2024-06-18 NOTE — ASSESSMENT & PLAN NOTE
Recurrent hospitalization; patient recounts approximately 5-6 hospitalizations related to obstruction -never requiring any surgical lysis of adhesions  6/2024: Last hospitalization at [facility]  -Referred to general surgery to discuss future strategy options

## 2024-06-18 NOTE — PROGRESS NOTES
Assessment & Plan   Problem List Items Addressed This Visit          Respiratory    Mild intermittent asthma without complication    Relevant Medications    albuterol (PROAIR HFA/PROVENTIL HFA/VENTOLIN HFA) 108 (90 Base) MCG/ACT inhaler       Digestive    Colon adenocarcinoma (H)     Prior history of colorectal cancer; prior hemicolectomy  Maintaining long-term colostomy  -Due for endoscopic surveillance, colorectal cancer screening  -Referred to [facility] for endoscopy; last completed 2019         Morbid obesity (H)    Small bowel obstruction (H)     Recurrent hospitalization; patient recounts approximately 5-6 hospitalizations related to obstruction -never requiring any surgical lysis of adhesions  6/2024: Last hospitalization at [place]  -Referred to general surgery to discuss future strategy options            Endocrine    Hyperlipidemia LDL goal <100     Maintained on atorvastatin  No known history of ischemic vascular disease    -Prior nuclear stress testing completed in 2020: No reversible ischemic changes         Relevant Medications    atorvastatin (LIPITOR) 40 MG tablet       Circulatory    Benign essential hypertension     current antihypertensive regimen: Olmesartan 40 mg daily  regimen changes:   intolerance:   future titration/work-up plan:           Relevant Medications    olmesartan (BENICAR) 40 MG tablet       Other    Status post colostomy (H) - Primary     Prior hemicolectomy with colostomy creation in '09         Health care maintenance     Discussed prostate cancer screening -historically has not pursued PSA testing  6/2024: Referred for colonoscopy given remote history of colorectal cancer and due for 5-year screening          Other Visit Diagnoses       Screening for colorectal cancer        Relevant Orders    Colonoscopy Screening  Referral    History of colorectal cancer        Relevant Orders    Colonoscopy Screening  Referral    Insomnia, unspecified type         "Relevant Medications    LORazepam (ATIVAN) 0.5 MG tablet                MED REC REQUIRED  Post Medication Reconciliation Status:  Discharge medications reconciled, continue medications without change  BMI  Estimated body mass index is 51.93 kg/m  as calculated from the following:    Height as of this encounter: 1.867 m (6' 1.5\").    Weight as of this encounter: 181 kg (399 lb).   Weight management plan: Discussed healthy diet and exercise guidelines    FUTURE APPOINTMENTS:       - Follow-up visit in 6 months      Christopher Saldana is a 64 year old, presenting for the following health issues: Presents for hospital follow-up and establish care.  Admitted locally for recurrent small bowel obstruction.  Managed conservatively with nasogastric tube decompression.  Symptoms resolved with confirmation of small bowel Follow-through.  Has tolerated diet since his discharge.  Scheduled to meet with general surgery on outpatient basis to discuss future strategies given her history of recurrence.  Shares that his father is critically ill, currently in nursing home locally.  Prior history of colorectal cancer with history of previous surgical resection; colostomy placement.  Last colonoscopy completed in 2019 -due for follow-up; prefers scheduling locally  Hospital F/U        6/18/2024    10:51 AM   Additional Questions   Roomed by Toña HARRIS   Accompanied by Manisha parikh     [facility]       Hospital Follow-up Visit:    Hospital/Nursing Home/IP Rehab Facility[facility]  Date of Admission: 6/10/2024  Date of Discharge: 6/13/2024  Reason(s) for Admission:bowel obstruction   Was the patient in the ICU or did the patient experience delirium during hospitalization?  No  Do you have any other stressors you would like to discuss with your provider? No    Problems taking medications regularly:  None  Medication changes since discharge: None  Problems adhering to non-medication therapy:  None    Summary of hospitalization:  " "CareEverywhere information obtained and reviewed  Diagnostic Tests/Treatments reviewed.  Follow up needed: none  Other Healthcare Providers Involved in Patient s Care:         None  Update since discharge: improved.         Plan of care communicated with patient             Review of Systems  Constitutional, HEENT, cardiovascular, pulmonary, gi and gu systems are negative, except as otherwise noted.      Objective    BP (!) 153/83   Pulse 61   Temp 97.3  F (36.3  C)   Resp 20   Ht 1.867 m (6' 1.5\")   Wt (!) 181 kg (399 lb)   SpO2 97%   BMI 51.93 kg/m    Body mass index is 51.93 kg/m .  Physical Exam   GENERAL: alert and no distress  NECK: no adenopathy, no asymmetry, masses, or scars  RESP: lungs clear to auscultation - no rales, rhonchi or wheezes  CV: regular rate and rhythm, normal S1 S2, no S3 or S4, no murmur, click or rub, no peripheral edema  ABDOMEN: soft, nontender, colostomy stoma draining;  bowel sounds normal  MS: no gross musculoskeletal defects noted, no edema            Signed Electronically by: Jose Angel Sousa MD    "

## 2024-06-18 NOTE — ASSESSMENT & PLAN NOTE
Maintained on atorvastatin  No known history of ischemic vascular disease    -Prior nuclear stress testing completed in 2020: No reversible ischemic changes

## 2024-06-18 NOTE — ASSESSMENT & PLAN NOTE
current antihypertensive regimen: Olmesartan 40 mg daily  regimen changes:   intolerance:   future titration/work-up plan:

## 2024-06-18 NOTE — ASSESSMENT & PLAN NOTE
Discussed prostate cancer screening -historically has not pursued PSA testing  6/2024: Referred for colonoscopy given remote history of colorectal cancer and due for 5-year screening

## 2024-07-27 ENCOUNTER — HEALTH MAINTENANCE LETTER (OUTPATIENT)
Age: 64
End: 2024-07-27

## 2024-10-06 ENCOUNTER — TELEPHONE (OUTPATIENT)
Dept: FAMILY MEDICINE | Facility: CLINIC | Age: 64
End: 2024-10-06
Payer: COMMERCIAL

## 2024-10-06 NOTE — TELEPHONE ENCOUNTER
Pt's spouse contacted me in the hospital    He has been having more recent troubles with colostomy leaking  Had gotten word from the hospital ostomy nurses to change to a flat skin barrier    Please contact [facility] [facility]  6     Patient's spouse states that they need a verbal order for 'flat skin barrier' substitute  Please contact patient with updates on ordering

## 2024-10-07 ENCOUNTER — TELEPHONE (OUTPATIENT)
Dept: FAMILY MEDICINE | Facility: CLINIC | Age: 64
End: 2024-10-07
Payer: COMMERCIAL

## 2024-10-07 NOTE — TELEPHONE ENCOUNTER
Call with Jose, spoke with representative Martha, given verbal orders for flat skin barrier. She will fax the orders for provider signature, once returned and processed, will ship pt the supplies.

## 2024-10-08 NOTE — TELEPHONE ENCOUNTER
Forms/Letter Request    Type of form/letter: OTHER: Detailed Written Order--Ostomy Supplies       Do we have the form/letter: Yes: in Dr. Sousa's box for signature    Who is the form from? [employer]     Where did/will the form come from? form was faxed in    When is form/letter needed by: soon    How would you like the form/letter returned: Fax : [phone]

## 2024-10-21 ENCOUNTER — OFFICE VISIT (OUTPATIENT)
Dept: FAMILY MEDICINE | Facility: CLINIC | Age: 64
End: 2024-10-21
Payer: COMMERCIAL

## 2024-10-21 VITALS
WEIGHT: 315 LBS | SYSTOLIC BLOOD PRESSURE: 115 MMHG | TEMPERATURE: 96.6 F | HEIGHT: 74 IN | HEART RATE: 80 BPM | BODY MASS INDEX: 40.43 KG/M2 | RESPIRATION RATE: 20 BRPM | DIASTOLIC BLOOD PRESSURE: 71 MMHG | OXYGEN SATURATION: 96 %

## 2024-10-21 DIAGNOSIS — I35.0 AORTIC STENOSIS, MODERATE: Chronic | ICD-10-CM

## 2024-10-21 DIAGNOSIS — I50.33 ACUTE ON CHRONIC DIASTOLIC CONGESTIVE HEART FAILURE (H): Primary | ICD-10-CM

## 2024-10-21 LAB
ANION GAP SERPL CALCULATED.3IONS-SCNC: 14 MMOL/L (ref 7–15)
BUN SERPL-MCNC: 37.1 MG/DL (ref 8–23)
CALCIUM SERPL-MCNC: 9.5 MG/DL (ref 8.8–10.4)
CHLORIDE SERPL-SCNC: 99 MMOL/L (ref 98–107)
CREAT SERPL-MCNC: 1.05 MG/DL (ref 0.67–1.17)
EGFRCR SERPLBLD CKD-EPI 2021: 79 ML/MIN/1.73M2
GLUCOSE SERPL-MCNC: 118 MG/DL (ref 70–99)
HCO3 SERPL-SCNC: 23 MMOL/L (ref 22–29)
POTASSIUM SERPL-SCNC: 4.8 MMOL/L (ref 3.4–5.3)
SODIUM SERPL-SCNC: 136 MMOL/L (ref 135–145)

## 2024-10-21 PROCEDURE — 36415 COLL VENOUS BLD VENIPUNCTURE: CPT | Performed by: FAMILY MEDICINE

## 2024-10-21 PROCEDURE — 91320 SARSCV2 VAC 30MCG TRS-SUC IM: CPT | Performed by: FAMILY MEDICINE

## 2024-10-21 PROCEDURE — 99495 TRANSJ CARE MGMT MOD F2F 14D: CPT | Performed by: FAMILY MEDICINE

## 2024-10-21 PROCEDURE — 80048 BASIC METABOLIC PNL TOTAL CA: CPT | Performed by: FAMILY MEDICINE

## 2024-10-21 PROCEDURE — 90480 ADMN SARSCOV2 VAC 1/ONLY CMP: CPT | Performed by: FAMILY MEDICINE

## 2024-10-21 RX ORDER — FLUTICASONE PROPIONATE AND SALMETEROL 250; 50 UG/1; UG/1
1 POWDER RESPIRATORY (INHALATION) 2 TIMES DAILY PRN
COMMUNITY
Start: 2024-03-09

## 2024-10-21 RX ORDER — POTASSIUM CHLORIDE 750 MG/1
20 TABLET, EXTENDED RELEASE ORAL DAILY
COMMUNITY
Start: 2024-10-19

## 2024-10-21 RX ORDER — ACETAMINOPHEN 325 MG/1
650 TABLET ORAL EVERY 6 HOURS
COMMUNITY

## 2024-10-21 RX ORDER — FUROSEMIDE 40 MG/1
40 TABLET ORAL 2 TIMES DAILY
COMMUNITY
Start: 2024-10-19

## 2024-10-21 ASSESSMENT — ASTHMA QUESTIONNAIRES
QUESTION_4 LAST FOUR WEEKS HOW OFTEN HAVE YOU USED YOUR RESCUE INHALER OR NEBULIZER MEDICATION (SUCH AS ALBUTEROL): ONCE A WEEK OR LESS
QUESTION_1 LAST FOUR WEEKS HOW MUCH OF THE TIME DID YOUR ASTHMA KEEP YOU FROM GETTING AS MUCH DONE AT WORK, SCHOOL OR AT HOME: A LITTLE OF THE TIME
QUESTION_3 LAST FOUR WEEKS HOW OFTEN DID YOUR ASTHMA SYMPTOMS (WHEEZING, COUGHING, SHORTNESS OF BREATH, CHEST TIGHTNESS OR PAIN) WAKE YOU UP AT NIGHT OR EARLIER THAN USUAL IN THE MORNING: NOT AT ALL
ACT_TOTALSCORE: 21
QUESTION_2 LAST FOUR WEEKS HOW OFTEN HAVE YOU HAD SHORTNESS OF BREATH: ONCE OR TWICE A WEEK
ACT_TOTALSCORE: 21
QUESTION_5 LAST FOUR WEEKS HOW WOULD YOU RATE YOUR ASTHMA CONTROL: WELL CONTROLLED

## 2024-10-21 NOTE — PROGRESS NOTES
"  Assessment & Plan     Acute on chronic diastolic congestive heart failure (H)  Patient reports improvement in her shortness of breath.  He also has notably less swelling in his legs.  He will continue with his current dose of furosemide at 40 mg twice daily.  Obtain BMP today.  Advise follow-up with Dr. Sousa in 3 to 4 weeks.    Aortic stenosis, moderate  Stable        MED REC REQUIRED  Post Medication Reconciliation Status:       MEDICATIONS:  Continue current medications without change    Christopher Saldana is a 64 year old, presenting for the following health issues:  Hospital F/U ([facility] follow up--10/17/24-10/19/24 for CHF)      10/21/2024     8:46 AM   Additional Questions   Roomed by Brigida BRIGGS CMA   Accompanied by Self     HPI       Hospital Follow-up Visit:    Hospital/Nursing Home/ Rehab Facility:  [facility]  Date of Admission: 10/17/2024  Date of Discharge: 10/19/2024  Reason(s) for Admission: CHF  Was the patient in the ICU or did the patient experience delirium during hospitalization?  No  Do you have any other stressors you would like to discuss with your provider? No    Problems taking medications regularly:  None  Medication changes since discharge: added K+ and furosemide  Problems adhering to non-medication therapy:  None    Summary of hospitalization:  CareEverywhere information obtained and reviewed  Diagnostic Tests/Treatments reviewed.  Follow up needed: none  Other Healthcare Providers Involved in Patient s Care:         None  Update since discharge: improved.         Plan of care communicated with patient                       Objective    /71 (BP Location: Right arm, Patient Position: Sitting, Cuff Size: Adult Large)   Pulse 80   Temp (!) 96.6  F (35.9  C) (Tympanic)   Resp 20   Ht 1.867 m (6' 1.5\")   Wt (!) 174.9 kg (385 lb 8 oz)   SpO2 96%   BMI 50.17 kg/m    Body mass index is 50.17 kg/m .  Physical Exam   GENERAL: alert and no distress  NECK: no adenopathy, no asymmetry, " masses, or scars  RESP: lungs clear to auscultation - no rales, rhonchi or wheezes  CV: regular rates and rhythm, normal S1 S2, no S3 or S4, grade 2/6 systolic murmur heard best over the upper right sternal border, peripheral pulses strong, and no peripheral edema  MS: no gross musculoskeletal defects noted, no edema            Signed Electronically by: Sammy Montanez MD

## 2024-11-12 ENCOUNTER — TELEPHONE (OUTPATIENT)
Dept: FAMILY MEDICINE | Facility: CLINIC | Age: 64
End: 2024-11-12
Payer: COMMERCIAL

## 2024-11-12 NOTE — TELEPHONE ENCOUNTER
Forms/Letter Request    Type of form/letter: DME (wheelchair, hospital bed)    Type of DME requested: Ostomy supplies    Do we have the form/letter: Yes: [employer] box    Who is the form from? [employer]     Where did/will the form come from? form was faxed in    When is form/letter needed by: soon    How would you like the form/letter returned: Fax : [phone]

## 2025-01-02 ENCOUNTER — TELEPHONE (OUTPATIENT)
Dept: FAMILY MEDICINE | Facility: CLINIC | Age: 65
End: 2025-01-02
Payer: COMMERCIAL

## 2025-01-02 NOTE — TELEPHONE ENCOUNTER
Forms/Letter Request    Type of form/letter: OTHER: Ostomy supplies       Do we have the form/letter: Yes: [employer] box    Who is the form from? [employer]     Where did/will the form come from? form was faxed in    When is form/letter needed by: soon

## 2025-02-16 ENCOUNTER — HEALTH MAINTENANCE LETTER (OUTPATIENT)
Age: 65
End: 2025-02-16

## 2025-03-19 ENCOUNTER — OFFICE VISIT (OUTPATIENT)
Dept: FAMILY MEDICINE | Facility: CLINIC | Age: 65
End: 2025-03-19
Payer: MEDICARE

## 2025-03-19 VITALS
WEIGHT: 315 LBS | TEMPERATURE: 97.2 F | OXYGEN SATURATION: 97 % | HEART RATE: 65 BPM | BODY MASS INDEX: 48.65 KG/M2 | SYSTOLIC BLOOD PRESSURE: 130 MMHG | RESPIRATION RATE: 18 BRPM | DIASTOLIC BLOOD PRESSURE: 77 MMHG

## 2025-03-19 DIAGNOSIS — Z91.89: ICD-10-CM

## 2025-03-19 DIAGNOSIS — R01.1 SYSTOLIC MURMUR: ICD-10-CM

## 2025-03-19 DIAGNOSIS — I35.0 AORTIC STENOSIS, MODERATE: ICD-10-CM

## 2025-03-19 DIAGNOSIS — G89.29 CHRONIC PAIN OF BOTH KNEES: ICD-10-CM

## 2025-03-19 DIAGNOSIS — M25.561 CHRONIC PAIN OF BOTH KNEES: ICD-10-CM

## 2025-03-19 DIAGNOSIS — Z09 HOSPITAL DISCHARGE FOLLOW-UP: Primary | ICD-10-CM

## 2025-03-19 DIAGNOSIS — M19.041 ARTHRITIS OF FINGER OF BOTH HANDS: ICD-10-CM

## 2025-03-19 DIAGNOSIS — N28.9 LESION OF RIGHT NATIVE KIDNEY: ICD-10-CM

## 2025-03-19 DIAGNOSIS — M25.562 CHRONIC PAIN OF BOTH KNEES: ICD-10-CM

## 2025-03-19 DIAGNOSIS — K56.609 SMALL BOWEL OBSTRUCTION (H): ICD-10-CM

## 2025-03-19 DIAGNOSIS — M19.042 ARTHRITIS OF FINGER OF BOTH HANDS: ICD-10-CM

## 2025-03-19 PROCEDURE — 99214 OFFICE O/P EST MOD 30 MIN: CPT | Performed by: STUDENT IN AN ORGANIZED HEALTH CARE EDUCATION/TRAINING PROGRAM

## 2025-03-19 PROCEDURE — 3078F DIAST BP <80 MM HG: CPT | Performed by: STUDENT IN AN ORGANIZED HEALTH CARE EDUCATION/TRAINING PROGRAM

## 2025-03-19 PROCEDURE — 1111F DSCHRG MED/CURRENT MED MERGE: CPT | Performed by: STUDENT IN AN ORGANIZED HEALTH CARE EDUCATION/TRAINING PROGRAM

## 2025-03-19 PROCEDURE — G2211 COMPLEX E/M VISIT ADD ON: HCPCS | Performed by: STUDENT IN AN ORGANIZED HEALTH CARE EDUCATION/TRAINING PROGRAM

## 2025-03-19 PROCEDURE — 3075F SYST BP GE 130 - 139MM HG: CPT | Performed by: STUDENT IN AN ORGANIZED HEALTH CARE EDUCATION/TRAINING PROGRAM

## 2025-03-19 ASSESSMENT — PATIENT HEALTH QUESTIONNAIRE - PHQ9
SUM OF ALL RESPONSES TO PHQ QUESTIONS 1-9: 3
SUM OF ALL RESPONSES TO PHQ QUESTIONS 1-9: 3
10. IF YOU CHECKED OFF ANY PROBLEMS, HOW DIFFICULT HAVE THESE PROBLEMS MADE IT FOR YOU TO DO YOUR WORK, TAKE CARE OF THINGS AT HOME, OR GET ALONG WITH OTHER PEOPLE: NOT DIFFICULT AT ALL

## 2025-03-19 NOTE — PROGRESS NOTES
"  Assessment & Plan     Hospital discharge follow-up  Small bowel obstruction (H)  Doing well following discharge for conservative management of recurrent SBO in the setting of prior abdominal surgeries following CRC/colonic resection.  Bowel movements almost back to normal, eating regular diet, no pain.  No tenderness on exam today.    Lesion of right native kidney  Ultrasound recommended for follow-up of 2 cm lesion seen on admission CT.  - US Renal Complete Non-Vascular; Future    Arthritis of finger of both hands  Chronic pain of both knees  Stable, refill requested.  Reviewed treatment option of joint injections if needed in the future.  - diclofenac (VOLTAREN) 1 % topical gel; Apply 4 g topically 3 times daily as needed for moderate pain.    Systolic murmur  Aortic stenosis, moderate  At risk for altered fluid volume  Reviewed hospital admission in October 2024 for reported acute on chronic heart failure.  It is unclear to me if this diagnosis fits him, as his clinical picture per the documentation may not fit CHF.  Discussed with him recommendation for repeat echo to better evaluate for pulmonary hypertension as his last echo was insufficient to determine if this may be playing a role.  He declines this referral at this time.  Currently stable off Lasix, appears euvolemic today.  Appreciated systolic murmur, known aortic stenosis.        MED REC REQUIRED  Post Medication Reconciliation Status:  Discharge medications reconciled, continue medications without change  BMI  Estimated body mass index is 48.65 kg/m  as calculated from the following:    Height as of 10/21/24: 1.867 m (6' 1.5\").    Weight as of this encounter: 169.6 kg (373 lb 12.8 oz).           Christopher Sanches is a 65 year old, presenting for the following health issues:  Hospital F/U (Pt was at [facility] 3/13-3/16 for a small bowel obstruction. )        3/19/2025     1:42 PM   Additional Questions   Roomed by Halley TSE " Evens, 65 years    Bowel Obstruction  - Hospitalized recently for bowel obstruction, not the first occurrence  - Previous surgery has led to scar tissue, increasing risk for bowel obstructions  - Hospitalization involved the use of an NG tube to decompress the bowels  - Experienced discomfort rather than pain during recovery  - Took longer for bowel function to return to normal this time  - Used Miralax and Miralax gummies to aid bowel movement  - Regularly takes Miralax with water or juice    Fluid Retention  - Experienced significant weight fluctuation, with a 12-14 lbs difference from hospital admission to discharge  - History of fluid retention in October and November, treated with Lasix at that time  - No current fluid retention issues reported    Arthritis and Knee Pain  - Arthritis in knuckles and knee  - Knee pain due to 80-85% bone-on-bone contact, confirmed by X-ray four years ago  - Uses topical cream for arthritis pain relief  - Has not tried steroid injections for knee pain    Weight Loss  - Lost approximately 45 lbs over the past year, contributing to improved condition    Family and Social Context  - Wife has undergone multiple joint surgeries and is familiar with cortisone treatments  - Recently retired at the end of January  - Engages in physical activity using [employer] on weekends        Hospital Follow-up Visit:    Hospital/Nursing Home/IP Rehab Facility: [facility]  Date of Admission: 3/13  Date of Discharge: 3/16  Reason(s) for Admission: Small bowel obstruction  Was the patient in the ICU or did the patient experience delirium during hospitalization?  No  Do you have any other stressors you would like to discuss with your provider? No    Problems taking medications regularly:  None  Medication changes since discharge: None  Problems adhering to non-medication therapy:  None    Summary of hospitalization:  CareEverywhere information obtained and reviewed  Diagnostic Tests/Treatments reviewed.   "Follow up needed: none  Other Healthcare Providers Involved in Patient s Care:         None  Update since discharge: improved.         \"CT with 2.3 cm indeterminate attenuation lesion in the right kidney. Recommend nonemergent/outpatient renal ultrasound for further evaluation.\"       Plan of care communicated with patient                 Objective    /77   Pulse 65   Temp 97.2  F (36.2  C) (Tympanic)   Resp 18   Wt (!) 169.6 kg (373 lb 12.8 oz)   SpO2 97%   BMI 48.65 kg/m    Body mass index is 48.65 kg/m .  Physical Exam  Constitutional:       General: He is not in acute distress.     Appearance: Normal appearance. He is not ill-appearing.   HENT:      Nose: Nose normal.      Mouth/Throat:      Mouth: Mucous membranes are moist.   Eyes:      Conjunctiva/sclera: Conjunctivae normal.   Cardiovascular:      Rate and Rhythm: Normal rate and regular rhythm.      Heart sounds: Murmur heard.   Pulmonary:      Effort: Pulmonary effort is normal.      Breath sounds: Normal breath sounds.   Abdominal:      General: There is no distension.      Palpations: Abdomen is soft.      Tenderness: There is no abdominal tenderness.   Skin:     General: Skin is warm.   Neurological:      General: No focal deficit present.      Mental Status: He is alert.   Psychiatric:         Mood and Affect: Mood normal.         Behavior: Behavior normal.                    Signed Electronically by: Elda Garibay MD    "

## 2025-07-22 DIAGNOSIS — Z12.5 ENCOUNTER FOR PROSTATE CANCER SCREENING: Primary | ICD-10-CM

## 2025-08-19 ENCOUNTER — LAB (OUTPATIENT)
Dept: LAB | Facility: CLINIC | Age: 65
End: 2025-08-19
Payer: MEDICARE

## 2025-08-19 DIAGNOSIS — Z13.6 SCREENING FOR CARDIOVASCULAR CONDITION: Primary | ICD-10-CM

## 2025-08-19 DIAGNOSIS — I10 BENIGN ESSENTIAL HYPERTENSION: ICD-10-CM

## 2025-08-19 DIAGNOSIS — R73.9 HYPERGLYCEMIA: ICD-10-CM

## 2025-08-19 DIAGNOSIS — I50.33 ACUTE ON CHRONIC DIASTOLIC CONGESTIVE HEART FAILURE (H): ICD-10-CM

## 2025-08-19 DIAGNOSIS — Z12.5 ENCOUNTER FOR PROSTATE CANCER SCREENING: ICD-10-CM

## 2025-08-19 DIAGNOSIS — E78.5 HYPERLIPIDEMIA LDL GOAL <100: ICD-10-CM

## 2025-08-19 LAB
ALT SERPL W P-5'-P-CCNC: 24 U/L (ref 0–70)
ANION GAP SERPL CALCULATED.3IONS-SCNC: 11 MMOL/L (ref 7–15)
BUN SERPL-MCNC: 13.2 MG/DL (ref 8–23)
CALCIUM SERPL-MCNC: 9.3 MG/DL (ref 8.8–10.4)
CHLORIDE SERPL-SCNC: 102 MMOL/L (ref 98–107)
CHOLEST SERPL-MCNC: 130 MG/DL
CREAT SERPL-MCNC: 0.7 MG/DL (ref 0.67–1.17)
EGFRCR SERPLBLD CKD-EPI 2021: >90 ML/MIN/1.73M2
ERYTHROCYTE [DISTWIDTH] IN BLOOD BY AUTOMATED COUNT: 13.7 % (ref 10–15)
FASTING STATUS PATIENT QL REPORTED: NO
FASTING STATUS PATIENT QL REPORTED: NO
GLUCOSE SERPL-MCNC: 141 MG/DL (ref 70–99)
HCO3 SERPL-SCNC: 24 MMOL/L (ref 22–29)
HCT VFR BLD AUTO: 39.7 % (ref 40–53)
HDLC SERPL-MCNC: 47 MG/DL
HGB BLD-MCNC: 13.1 G/DL (ref 13.3–17.7)
LDLC SERPL CALC-MCNC: 62 MG/DL
MCH RBC QN AUTO: 29 PG (ref 26.5–33)
MCHC RBC AUTO-ENTMCNC: 33 G/DL (ref 31.5–36.5)
MCV RBC AUTO: 88 FL (ref 78–100)
NONHDLC SERPL-MCNC: 83 MG/DL
PLATELET # BLD AUTO: 194 10E3/UL (ref 150–450)
POTASSIUM SERPL-SCNC: 4.2 MMOL/L (ref 3.4–5.3)
PSA SERPL DL<=0.01 NG/ML-MCNC: 0.13 NG/ML (ref 0–4.5)
RBC # BLD AUTO: 4.51 10E6/UL (ref 4.4–5.9)
SODIUM SERPL-SCNC: 137 MMOL/L (ref 135–145)
TRIGL SERPL-MCNC: 106 MG/DL
TSH SERPL DL<=0.005 MIU/L-ACNC: 3.92 UIU/ML (ref 0.3–4.2)
WBC # BLD AUTO: 7.5 10E3/UL (ref 4–11)

## 2025-08-19 PROCEDURE — 83036 HEMOGLOBIN GLYCOSYLATED A1C: CPT

## 2025-08-19 PROCEDURE — 80048 BASIC METABOLIC PNL TOTAL CA: CPT | Performed by: UROLOGY

## 2025-08-19 PROCEDURE — 36415 COLL VENOUS BLD VENIPUNCTURE: CPT

## 2025-08-19 PROCEDURE — 80061 LIPID PANEL: CPT | Performed by: UROLOGY

## 2025-08-19 PROCEDURE — 85027 COMPLETE CBC AUTOMATED: CPT

## 2025-08-19 PROCEDURE — 84443 ASSAY THYROID STIM HORMONE: CPT | Performed by: UROLOGY

## 2025-08-19 PROCEDURE — 84153 ASSAY OF PSA TOTAL: CPT | Performed by: UROLOGY

## 2025-08-19 PROCEDURE — 84460 ALANINE AMINO (ALT) (SGPT): CPT | Performed by: UROLOGY

## 2025-08-21 LAB
EST. AVERAGE GLUCOSE BLD GHB EST-MCNC: 117 MG/DL
HBA1C MFR BLD: 5.7 % (ref 0–5.6)

## 2025-08-24 DIAGNOSIS — E78.5 HYPERLIPIDEMIA LDL GOAL <100: ICD-10-CM

## 2025-08-24 DIAGNOSIS — I10 BENIGN ESSENTIAL HYPERTENSION: ICD-10-CM

## 2025-08-25 RX ORDER — OLMESARTAN MEDOXOMIL 40 MG/1
40 TABLET ORAL DAILY
Qty: 90 TABLET | Refills: 1 | Status: SHIPPED | OUTPATIENT
Start: 2025-08-25

## 2025-08-25 RX ORDER — ATORVASTATIN CALCIUM 40 MG/1
40 TABLET, FILM COATED ORAL DAILY
Qty: 90 TABLET | Refills: 1 | Status: SHIPPED | OUTPATIENT
Start: 2025-08-25

## 2025-09-03 ENCOUNTER — TELEPHONE (OUTPATIENT)
Dept: FAMILY MEDICINE | Facility: CLINIC | Age: 65
End: 2025-09-03
Payer: COMMERCIAL

## 2025-09-03 DIAGNOSIS — C18.9 COLON ADENOCARCINOMA (H): ICD-10-CM

## 2025-09-03 DIAGNOSIS — Z93.3 STATUS POST COLOSTOMY (H): Primary | ICD-10-CM
